# Patient Record
Sex: FEMALE | Race: OTHER | URBAN - METROPOLITAN AREA
[De-identification: names, ages, dates, MRNs, and addresses within clinical notes are randomized per-mention and may not be internally consistent; named-entity substitution may affect disease eponyms.]

---

## 2023-01-31 ENCOUNTER — OFFICE VISIT (OUTPATIENT)
Dept: FAMILY MEDICINE CLINIC | Age: 33
End: 2023-01-31

## 2023-01-31 ENCOUNTER — HOSPITAL ENCOUNTER (OUTPATIENT)
Dept: LAB | Age: 33
Discharge: HOME | End: 2023-01-31

## 2023-01-31 VITALS
OXYGEN SATURATION: 99 % | HEART RATE: 75 BPM | HEIGHT: 61 IN | BODY MASS INDEX: 26.43 KG/M2 | SYSTOLIC BLOOD PRESSURE: 113 MMHG | DIASTOLIC BLOOD PRESSURE: 74 MMHG | WEIGHT: 140 LBS | TEMPERATURE: 98.1 F

## 2023-01-31 DIAGNOSIS — R10.2 PELVIC PAIN: ICD-10-CM

## 2023-01-31 DIAGNOSIS — G43.109 MIGRAINE WITH AURA AND WITHOUT STATUS MIGRAINOSUS, NOT INTRACTABLE: ICD-10-CM

## 2023-01-31 DIAGNOSIS — R10.2 PELVIC PAIN: Primary | ICD-10-CM

## 2023-01-31 LAB
BILIRUB UR QL STRIP: NEGATIVE
GLUCOSE UR-MCNC: NEGATIVE MG/DL
KETONES P FAST UR STRIP-MCNC: NEGATIVE MG/DL
PH UR STRIP: 6.5 [PH] (ref 4.6–8)
PROT UR QL STRIP: NEGATIVE
SP GR UR STRIP: 1.03 (ref 1–1.03)
UA UROBILINOGEN AMB POC: NORMAL (ref 0.2–1)
URINALYSIS CLARITY POC: CLEAR
URINALYSIS COLOR POC: YELLOW
URINE BLOOD POC: NEGATIVE
URINE LEUKOCYTES POC: NEGATIVE
URINE NITRITES POC: NEGATIVE

## 2023-01-31 PROCEDURE — 99203 OFFICE O/P NEW LOW 30 MIN: CPT | Performed by: FAMILY MEDICINE

## 2023-01-31 PROCEDURE — 81002 URINALYSIS NONAUTO W/O SCOPE: CPT | Performed by: FAMILY MEDICINE

## 2023-01-31 NOTE — PROGRESS NOTES
Bria Hernandez is a 29 y.o. female   Chief Complaint   Patient presents with    Abdominal Pain     Patient c/o LLQ pain after menstrual cycle. Pain is describes as burning. Patient states she had \"tubal burning\" about 7 years ago. Pain is 5/10 mostly occurs at night.  Headache     Patient c/o migraines. One-sided headaches, severe enough that causes nausea. ASSESSMENT AND PLAN:    1. Pelvic pain  Considering endometriosis,ovarian cyst, fibroids, PID. Also constipation. Labs and pelvic ultrasound for workup.  - AMB POC URINALYSIS DIP STICK MANUAL W/O MICRO  - CBC WITH AUTOMATED DIFF; Future  - METABOLIC PANEL, COMPREHENSIVE; Future  - CT/NG/T.VAGINALIS AMPLIFICATION; Future  - US TRANSVAGINAL; Future  - US PELV NON OBS; Future    2. Migraine with aura and without status migrainosus, not intractable  Discussed preventive v. Abortive therapy. Pt is content with excedrin for abortive treatment when needed. - TSH 3RD GENERATION; Future      SUBJECTIVE:    HPI:  Silas Craig. Gurwinder Fernandez is a 29 y.o. female who presents for evaluation of LLQ/pelvic pain that has occurred after her menstrual cycle for 2-3 years. It is worst overnight and first thing in the morning. It is not generally bothersome during the day. It feels like a burning pain. She also has copious clear vaginal discharge during this time. She has occasional itching/irritation. Periods are regular. LMP about 1 week ago (1/24ish). She had BTL after her C/S 7 years ago. She reports occasional dysuria and urinary frequency. No STI history  Last pap about 7 years ago. She does suffer from constipation. She his migraine headaches occurring 3-4x/week. It responds to Excedrin. If she doesn't take excedrin right away she'll develop nausea, dizziness, photo and phonophobia and will have to lie down in a dark quiet room. She has never been on any prescription abortive or preventive medications.       Review of Systems Constitutional:  Negative for fever and malaise/fatigue. Eyes:  Negative for blurred vision. Respiratory:  Negative for cough and shortness of breath. Cardiovascular:  Negative for chest pain, palpitations and leg swelling. Gastrointestinal:  Positive for abdominal pain and constipation. Negative for diarrhea, nausea and vomiting. Genitourinary:  Positive for dysuria and frequency. Negative for flank pain, hematuria and urgency. Neurological:  Positive for headaches. /74 (BP 1 Location: Right upper arm, BP Patient Position: Sitting, BP Cuff Size: Adult)   Pulse 75   Temp 98.1 °F (36.7 °C) (Temporal)   Ht 5' 0.63\" (1.54 m)   Wt 140 lb (63.5 kg)   LMP 01/23/2023 (Approximate)   SpO2 99%   BMI 26.78 kg/m²     Physical Exam  Constitutional:       General: She is not in acute distress. Appearance: Normal appearance. HENT:      Mouth/Throat:      Pharynx: No oropharyngeal exudate or posterior oropharyngeal erythema. Eyes:      Extraocular Movements: Extraocular movements intact. Conjunctiva/sclera: Conjunctivae normal.      Pupils: Pupils are equal, round, and reactive to light. Cardiovascular:      Rate and Rhythm: Normal rate and regular rhythm. Heart sounds: Normal heart sounds. Pulmonary:      Effort: Pulmonary effort is normal.      Breath sounds: Normal breath sounds. Abdominal:      General: Bowel sounds are normal. There is no distension. Palpations: Abdomen is soft. Tenderness: There is abdominal tenderness (LLQ, left pelvic, suprapubic). There is no guarding. Musculoskeletal:      Cervical back: No tenderness. Lymphadenopathy:      Cervical: No cervical adenopathy. Neurological:      Mental Status: She is alert.

## 2023-01-31 NOTE — PROGRESS NOTES
Assisted in discharge. Name and date of birth of the patient verified. Information on the AVS was explained to patient/guardian and understanding was verbalized Time was made for question and answers. Assisted patient in scheduling [address] appointments. Appointment on February 9 at 10:00 at [facility]. Patient special instructions to drink 32-48 oz of water before procedure. Patient advised to call the [facility] to have an appointment with an  to apply for financial assistance/Care Card.

## 2023-01-31 NOTE — PROGRESS NOTES
Chief Complaint   Patient presents with    Abdominal Pain     Patient c/o LLQ pain after menstrual cycle. Pain is describes as burning. Patient states she had \"tubal burning\" about 7 years ago. Pain is 5/10 mostly occurs at night. Headache     Patient c/o migraines. One-sided headaches, severe enough that causes nausea.         Results for orders placed or performed in visit on 01/31/23   AMB POC URINALYSIS DIP STICK MANUAL W/O MICRO   Result Value Ref Range    Color (UA POC) Yellow     Clarity (UA POC) Clear     Glucose (UA POC) Negative Negative    Bilirubin (UA POC) Negative Negative    Ketones (UA POC) Negative Negative    Specific gravity (UA POC) 1.030 1.001 - 1.035    Blood (UA POC) Negative Negative    pH (UA POC) 6.5 4.6 - 8.0    Protein (UA POC) Negative Negative    Urobilinogen (UA POC) normal 0.2 - 1    Nitrites (UA POC) Negative Negative    Leukocyte esterase (UA POC) Negative Negative

## 2023-02-01 LAB
ALBUMIN SERPL-MCNC: 4 G/DL (ref 3.5–5)
ALBUMIN/GLOB SERPL: 1.3 (ref 1.1–2.2)
ALP SERPL-CCNC: 99 U/L (ref 45–117)
ALT SERPL-CCNC: 23 U/L (ref 12–78)
ANION GAP SERPL CALC-SCNC: 6 MMOL/L (ref 5–15)
AST SERPL-CCNC: 13 U/L (ref 15–37)
BASOPHILS # BLD: 0.1 K/UL (ref 0–0.1)
BASOPHILS NFR BLD: 1 % (ref 0–1)
BILIRUB SERPL-MCNC: 0.2 MG/DL (ref 0.2–1)
BUN SERPL-MCNC: 15 MG/DL (ref 6–20)
BUN/CREAT SERPL: 33 (ref 12–20)
CALCIUM SERPL-MCNC: 9.2 MG/DL (ref 8.5–10.1)
CHLORIDE SERPL-SCNC: 106 MMOL/L (ref 97–108)
CO2 SERPL-SCNC: 27 MMOL/L (ref 21–32)
COMMENT, HOLDF: NORMAL
CREAT SERPL-MCNC: 0.45 MG/DL (ref 0.55–1.02)
DIFFERENTIAL METHOD BLD: ABNORMAL
EOSINOPHIL # BLD: 0.1 K/UL (ref 0–0.4)
EOSINOPHIL NFR BLD: 1 % (ref 0–7)
ERYTHROCYTE [DISTWIDTH] IN BLOOD BY AUTOMATED COUNT: 15.1 % (ref 11.5–14.5)
GLOBULIN SER CALC-MCNC: 3.2 G/DL (ref 2–4)
GLUCOSE SERPL-MCNC: 79 MG/DL (ref 65–100)
HCT VFR BLD AUTO: 36.6 % (ref 35–47)
HGB BLD-MCNC: 11.1 G/DL (ref 11.5–16)
IMM GRANULOCYTES # BLD AUTO: 0 K/UL (ref 0–0.04)
IMM GRANULOCYTES NFR BLD AUTO: 0 % (ref 0–0.5)
LYMPHOCYTES # BLD: 2.4 K/UL (ref 0.8–3.5)
LYMPHOCYTES NFR BLD: 25 % (ref 12–49)
MCH RBC QN AUTO: 25 PG (ref 26–34)
MCHC RBC AUTO-ENTMCNC: 30.3 G/DL (ref 30–36.5)
MCV RBC AUTO: 82.4 FL (ref 80–99)
MONOCYTES # BLD: 0.7 K/UL (ref 0–1)
MONOCYTES NFR BLD: 7 % (ref 5–13)
NEUTS SEG # BLD: 6.3 K/UL (ref 1.8–8)
NEUTS SEG NFR BLD: 66 % (ref 32–75)
NRBC # BLD: 0 K/UL (ref 0–0.01)
NRBC BLD-RTO: 0 PER 100 WBC
PLATELET # BLD AUTO: 324 K/UL (ref 150–400)
PMV BLD AUTO: 10.5 FL (ref 8.9–12.9)
POTASSIUM SERPL-SCNC: 4.3 MMOL/L (ref 3.5–5.1)
PROT SERPL-MCNC: 7.2 G/DL (ref 6.4–8.2)
RBC # BLD AUTO: 4.44 M/UL (ref 3.8–5.2)
SAMPLES BEING HELD,HOLD: NORMAL
SODIUM SERPL-SCNC: 139 MMOL/L (ref 136–145)
TSH SERPL DL<=0.05 MIU/L-ACNC: 1.13 UIU/ML (ref 0.36–3.74)
WBC # BLD AUTO: 9.6 K/UL (ref 3.6–11)

## 2023-02-01 PROCEDURE — 80053 COMPREHEN METABOLIC PANEL: CPT

## 2023-02-01 PROCEDURE — 36415 COLL VENOUS BLD VENIPUNCTURE: CPT

## 2023-02-01 PROCEDURE — 84443 ASSAY THYROID STIM HORMONE: CPT

## 2023-02-01 PROCEDURE — 85025 COMPLETE CBC W/AUTO DIFF WBC: CPT

## 2023-02-01 PROCEDURE — 87491 CHLMYD TRACH DNA AMP PROBE: CPT

## 2023-02-03 LAB
C TRACH RRNA SPEC QL NAA+PROBE: NORMAL
N GONORRHOEA RRNA SPEC QL NAA+PROBE: NORMAL
SPECIMEN SOURCE: NORMAL
T VAGINALIS RRNA SPEC QL NAA+PROBE: NEGATIVE

## 2023-02-09 ENCOUNTER — HOSPITAL ENCOUNTER (OUTPATIENT)
Dept: ULTRASOUND IMAGING | Age: 33
Discharge: HOME | End: 2023-02-09
Attending: FAMILY MEDICINE

## 2023-02-09 ENCOUNTER — HOSPITAL ENCOUNTER (OUTPATIENT)
Dept: ULTRASOUND IMAGING | Age: 33
End: 2023-02-09
Attending: FAMILY MEDICINE

## 2023-02-09 DIAGNOSIS — R10.2 PELVIC PAIN: ICD-10-CM

## 2023-02-09 PROCEDURE — 76856 US EXAM PELVIC COMPLETE: CPT

## 2023-02-09 PROCEDURE — 76830 TRANSVAGINAL US NON-OB: CPT

## 2023-02-11 NOTE — PROGRESS NOTES
Please add her to my Virtual Visits this Wednesday 2/15 to review her ultrasound and lab results. Labs: mild anemia Hgb 11.1. Normal CMP, TSH. Negative GC/CT/TV. Ultrasound: Normal, with the exception of a postsurgical seroma along the uterus.

## 2023-02-15 ENCOUNTER — VIRTUAL VISIT (OUTPATIENT)
Dept: FAMILY MEDICINE CLINIC | Age: 33
End: 2023-02-15

## 2023-02-15 DIAGNOSIS — D50.9 IRON DEFICIENCY ANEMIA, UNSPECIFIED IRON DEFICIENCY ANEMIA TYPE: ICD-10-CM

## 2023-02-15 DIAGNOSIS — R10.2 PELVIC PAIN: Primary | ICD-10-CM

## 2023-02-15 PROCEDURE — 99213 OFFICE O/P EST LOW 20 MIN: CPT | Performed by: FAMILY MEDICINE

## 2023-02-15 NOTE — PROGRESS NOTES
Blanca Brannon is a 29 y.o. female   Chief Complaint   Patient presents with    Other     Review ultrasound and lab results          ASSESSMENT AND PLAN:    1. Pelvic pain  Hypoechoic area overlying uterus likely seroma. I wonder if it could be an endometrioma. Possible cause to her pain. Will refer to Gyn for further evaluation and treatment.    - REFERRAL TO GYNECOLOGY    2. Anemia  Hgb 11.1. Suggested MV with iron or iron supplementation. Pt isn't working and doesn't have much money. Recommended she could try to prioritize iron rich foods. SUBJECTIVE:    HPI:  Remington LucasHna Regalado is a 29 y.o. female who presents  to review results. She was seen 1/31 with Left pelvic pain which has been occurring after each menstrual cycle for about 3 years. Normal CMP, TSH. Negative GC/CT/TV. Ultrasound: \"anterior to the uterus is a hypoechoic heterogeneous oblong area measuring up to 5 x 2.6 x 1.6 cm, which may represent a postoperative seroma or additional scar. \"    No new concerns. Review of Systems   Constitutional:  Negative for fever, malaise/fatigue and weight loss. HENT:  Negative for congestion. Respiratory:  Negative for cough, shortness of breath and wheezing. Gastrointestinal:  Negative for abdominal pain, constipation, diarrhea, nausea and vomiting. Neurological:  Negative for headaches. Endo/Heme/Allergies:  Negative for polydipsia. LMP 01/23/2023 (Approximate)     Physical Exam - not performed; telephone encounter.

## 2023-02-15 NOTE — PROGRESS NOTES
Slade Jaimes was spoken to at the time of virtual discharge. Full name and  verified. The After Visit Summary was reviewed along with discharge instructions. Patient understands she can call the [place] [address] phone line to schedule an appointment for a pap test at her convenience. Patient was advised that she will be contacted by a [address] [place] [address] Coordinator in regards to her Gynecology referral. Patient was notified that [facility] has its own financial screening. Time for questions and answers provided, patient verbalizes understanding.

## 2023-02-15 NOTE — PROGRESS NOTES
Coordination of Care  1. Have you been to the ER, urgent care clinic since your last visit? Hospitalized since your last visit? No    2. Have you seen or consulted any other health care providers outside of the [address] [place] [address] since your last visit? Include any pap smears or colon screening. No    Does the patient need refills? YES    Learning Assessment Complete?  yes  Depression Screening complete in the past 12 months? yes    Chief Complaint   Patient presents with    Other     Review ultrasound and lab results

## 2023-02-22 ENCOUNTER — TELEPHONE (OUTPATIENT)
Dept: FAMILY MEDICINE CLINIC | Age: 33
End: 2023-02-22

## 2023-02-22 NOTE — TELEPHONE ENCOUNTER
Returned pt call. She wanted to cancel appt tomorrow because she has received some bills. I explained there is no charge for seeing the OW tomorrow and that OW will assist with FA application. Sent OW a message.

## 2023-02-23 ENCOUNTER — OFFICE VISIT (OUTPATIENT)
Dept: FAMILY MEDICINE CLINIC | Age: 33
End: 2023-02-23

## 2023-02-23 DIAGNOSIS — Z71.89 COUNSELING AND COORDINATION OF CARE: Primary | ICD-10-CM

## 2023-02-23 PROCEDURE — 99080 SPECIAL REPORTS OR FORMS: CPT | Performed by: FAMILY MEDICINE

## 2023-02-23 NOTE — PROGRESS NOTES
AN financial screening is complete. Patient has been instructed to call appointment line on or after 3/16/23. Patient has been screened for [facility]. Patient has requested information for [facility]. Information has been provided and patient has been informed [facility] provided food in needed.

## 2023-03-21 ENCOUNTER — OFFICE VISIT (OUTPATIENT)
Dept: FAMILY MEDICINE CLINIC | Age: 33
End: 2023-03-21

## 2023-03-21 ENCOUNTER — HOSPITAL ENCOUNTER (OUTPATIENT)
Facility: HOSPITAL | Age: 33
Setting detail: SPECIMEN
Discharge: HOME | End: 2023-03-24

## 2023-03-21 VITALS
WEIGHT: 138 LBS | SYSTOLIC BLOOD PRESSURE: 115 MMHG | OXYGEN SATURATION: 98 % | TEMPERATURE: 97.3 F | BODY MASS INDEX: 26.39 KG/M2 | HEART RATE: 75 BPM | DIASTOLIC BLOOD PRESSURE: 66 MMHG

## 2023-03-21 DIAGNOSIS — Z01.419 ENCOUNTER FOR WELL WOMAN EXAM: Primary | ICD-10-CM

## 2023-03-21 DIAGNOSIS — Z71.89 COUNSELING AND COORDINATION OF CARE: Primary | ICD-10-CM

## 2023-03-21 DIAGNOSIS — R10.2 PELVIC PAIN: ICD-10-CM

## 2023-03-21 PROCEDURE — 99080 SPECIAL REPORTS OR FORMS: CPT | Performed by: FAMILY MEDICINE

## 2023-03-21 PROCEDURE — 88175 CYTOPATH C/V AUTO FLUID REDO: CPT

## 2023-03-21 PROCEDURE — 99213 OFFICE O/P EST LOW 20 MIN: CPT | Performed by: PHYSICIAN ASSISTANT

## 2023-03-21 PROCEDURE — 87624 HPV HI-RISK TYP POOLED RSLT: CPT

## 2023-03-21 NOTE — PROGRESS NOTES
[address] [facility]    When was your last pap smear and where? 2016    Any abnormal results from previous pap smears? no    Any problems with your breasts? no    Any family history of breast/ovarian cancer? no    Do you have any kids? yes    Oldest 13.11 youngest 9    Are you sexually active? yes    Are you using any type of birth control? If yes where do you go for this? no    Abuse screening completed this visit?  yes

## 2023-03-21 NOTE — PROGRESS NOTES
Assessment/Plan:    Diagnoses and all orders for this visit:    1. Encounter for well woman exam  -     PAP IG, APTIMA HPV AND RFX 16/18,45 ([id number]); Future    2. Pelvic pain    Firm uterus just out of pelvis but her pain is actually left adnexal region and I don't feel a corresponding mass there. She has been approved to see gyn through access now and she was told today that she must call for the appt     Follow-up and Dispositions    Return in about 1 year (around 3/21/2024). Dustin Gil PA-C  [address]  expressed understanding of this plan. An AVS was printed and given to the patient.      ----------------------------------------------------------------------    Chief Complaint   Patient presents with    Gyn Exam       History of Present Illness:  Pt presents for well woman exam  She is in the process of being referred to gyn for chronic pelvic pain and abnl ultrasound  She is having monthly periods.  all 3 c/s and she had BTL after last c/s  She had her last pap about 7 years ago. She never had an abnormal pap  She is in a safe relationship and has no risk for DV       No past medical history on file. Current Outpatient Medications   Medication Sig Dispense Refill    aspirin/acetaminophen/caffeine (EXCEDRIN MIGRAINE PO) Take  by mouth. No Known Allergies    Social History     Tobacco Use    Smoking status: Never    Smokeless tobacco: Never   Substance Use Topics    Alcohol use: Not Currently     Comment: occassional    Drug use: Never       No family history on file. Physical Exam:     Visit Vitals  /66   Pulse 75   Temp 97.3 °F (36.3 °C) (Temporal)   Wt 138 lb (62.6 kg)   LMP 2023   SpO2 98%   BMI 26.39 kg/m²       A&Ox3  WDWN NAD  Respirations normal and non labored  Pelvic exam- ext neg for lesion or discharge. Cervix nulliparous w/out lesion or discharge. Bimanual shows firm uterus, just out of pelvis not tender.  No adnexal masses felt but tender over left adnexal region. US Results (maximum last 3): Results from Hospital Encounter encounter on 23    US TRANSVAGINAL    Narrative  HISTORY: Pelvic pain after menstruation    Technique: Routine transpelvic ultrasound images were obtained as well as  transvaginal images for better definition of the uterus and adnexa. FINDINGS: The transpelvic images demonstrate a distended urinary bladder without  evidence of filling defect. The uterus measures 8.6 x 4.3 x 5.9 cm. Myometrial  echogenicity is normal. The endometrial stripe measures 11 mm by transpelvic  technique. The right ovary measures 2.9 cm with intact vascularity. The left  ovary measures 3.5 cm with intact vascularity. Both ovaries demonstrate normal  echogenicity and no evidence of mass. Transvaginal images demonstrate a normal endometrial stripe measuring 8 mm in  thickness. No myometrial mass is seen. The cervix has a normal appearance. Within the  scar, there is a 12 x 6 x 10 mm anechoic area, and anterior  to the uterus is a hypoechoic heterogeneous oblong area measuring up to 5 x 2.6  x 1.6 cm, which may represent a postoperative seroma or additional scar. The  right ovary measures 2.7 cm. The left ovary measures 3.3 cm. There is a  dominant left ovarian follicle measuring 19 mm. There is no pelvic free fluid. Impression  1. Evidence of prior , with associated scar and suspected small  postsurgical seroma anterior to the uterus. 2. Normal ovaries and adnexa. US PELV NON OBS    Narrative  HISTORY: Pelvic pain after menstruation    Technique: Routine transpelvic ultrasound images were obtained as well as  transvaginal images for better definition of the uterus and adnexa. FINDINGS: The transpelvic images demonstrate a distended urinary bladder without  evidence of filling defect. The uterus measures 8.6 x 4.3 x 5.9 cm.  Myometrial  echogenicity is normal. The endometrial stripe measures 11 mm by transpelvic  technique. The right ovary measures 2.9 cm with intact vascularity. The left  ovary measures 3.5 cm with intact vascularity. Both ovaries demonstrate normal  echogenicity and no evidence of mass. Transvaginal images demonstrate a normal endometrial stripe measuring 8 mm in  thickness. No myometrial mass is seen. The cervix has a normal appearance. Within the  scar, there is a 12 x 6 x 10 mm anechoic area, and anterior  to the uterus is a hypoechoic heterogeneous oblong area measuring up to 5 x 2.6  x 1.6 cm, which may represent a postoperative seroma or additional scar. The  right ovary measures 2.7 cm. The left ovary measures 3.3 cm. There is a  dominant left ovarian follicle measuring 19 mm. There is no pelvic free fluid. Impression  1. Evidence of prior , with associated scar and suspected small  postsurgical seroma anterior to the uterus. 2. Normal ovaries and adnexa.

## 2023-03-21 NOTE — PROGRESS NOTES
Patient came to [facility] as a walk-in. Patient needed clarification in regards to the AN program and the CC application. OW has answered all questions for patient and has provided work cell phone number and advised patient to call if she had further questions.

## 2023-03-30 ENCOUNTER — DOCUMENTATION ONLY (OUTPATIENT)
Dept: FAMILY PLANNING/WOMEN'S HEALTH CLINIC | Age: 33
End: 2023-03-30

## 2023-03-30 ENCOUNTER — TELEPHONE (OUTPATIENT)
Dept: FAMILY MEDICINE CLINIC | Age: 33
End: 2023-03-30

## 2023-03-30 NOTE — PROGRESS NOTES
Pap result review; pap is scanned under media section  Neg cytology, neg HPV. No evidence of BV on exam. Please let the pt know that her next pap is due in 5 years, 2028.  Thank you

## 2023-03-30 NOTE — TELEPHONE ENCOUNTER
SDOH f/up: Patient says she did not receive [address] information. OW explained process to get food and sent [address] phone number via text message after telephone encounter. Score 4.

## 2023-05-30 ENCOUNTER — OFFICE VISIT (OUTPATIENT)
Age: 33
End: 2023-05-30
Payer: SUBSIDIZED

## 2023-05-30 VITALS — WEIGHT: 139 LBS | BODY MASS INDEX: 26.59 KG/M2 | SYSTOLIC BLOOD PRESSURE: 110 MMHG | DIASTOLIC BLOOD PRESSURE: 74 MMHG

## 2023-05-30 DIAGNOSIS — N99.842 SEROMA OF GENITOURINARY SYSTEM AFTER GENITOURINARY SYSTEM PROCEDURE: ICD-10-CM

## 2023-05-30 DIAGNOSIS — N94.6 DYSMENORRHEA: Primary | ICD-10-CM

## 2023-05-30 DIAGNOSIS — R10.32 LLQ PAIN: ICD-10-CM

## 2023-05-30 PROCEDURE — 99204 OFFICE O/P NEW MOD 45 MIN: CPT | Performed by: OBSTETRICS & GYNECOLOGY

## 2023-05-30 RX ORDER — ACETAMINOPHEN, ASPIRIN AND CAFFEINE 250; 250; 65 MG/1; MG/1; MG/1
TABLET, FILM COATED ORAL
COMMUNITY

## 2023-05-30 NOTE — PROGRESS NOTES
GYN Problem Visit    Christina Allen is a 29 y.o.  presenting for problem visit. Interpretor #[id number] initially, session timed out. Visit completed with Interpretor #[id number]. Her main concern today is LLQ pain for the last seven years. The pain is worsening in intensity. Patient reports the pain occurs 2 days after completion of her cycle. Pain last for about a week and then subsides. She takes over-the-counter medication for the pain. The pain is burning in nature. She has seen her primary care physician regarding this pain. She was sent here for further work-up. Her history is complicated by dysmenorrhea that has been present for years. She denies ever being told that she had endometriosis. She has had 3 peripheral previous  sections with a tubal ligation. Ob/Gyn Hx:   - LTCS  LMP- \"beginning of May\"   Menses- regular  Contraception- BTL  SA- yes, male partner      History reviewed. No pertinent past medical history. Past Surgical History:   Procedure Laterality Date    TUBAL LIGATION         History reviewed. No pertinent family history.     Social History     Socioeconomic History    Marital status: Single     Spouse name: Not on file    Number of children: Not on file    Years of education: Not on file    Highest education level: Not on file   Occupational History    Not on file   Tobacco Use    Smoking status: Never    Smokeless tobacco: Never   Substance and Sexual Activity    Alcohol use: Not Currently    Drug use: Never    Sexual activity: Not on file   Other Topics Concern    Not on file   Social History Narrative    Not on file     Social Determinants of Health     Financial Resource Strain: High Risk    Difficulty of Paying Living Expenses: Very hard   Food Insecurity: Food Insecurity Present    Worried About Running Out of Food in the Last Year: Sometimes true    Ran Out of Food in the Last Year: Sometimes true   Transportation Needs: Not on file

## 2023-05-30 NOTE — CONSULTS
Session ID: [id number]  Request ID: [id number]  Language: Sinhala  Status: Fulfilled   ID: #[id number]   Name: Dipak Keane

## 2023-06-27 ENCOUNTER — OFFICE VISIT (OUTPATIENT)
Age: 33
End: 2023-06-27
Payer: SUBSIDIZED

## 2023-06-27 VITALS — DIASTOLIC BLOOD PRESSURE: 80 MMHG | SYSTOLIC BLOOD PRESSURE: 104 MMHG | BODY MASS INDEX: 27.08 KG/M2 | WEIGHT: 141.6 LBS

## 2023-06-27 DIAGNOSIS — G43.809 OTHER MIGRAINE WITHOUT STATUS MIGRAINOSUS, NOT INTRACTABLE: ICD-10-CM

## 2023-06-27 DIAGNOSIS — R10.2 CYCLICAL PELVIC PAIN: Primary | ICD-10-CM

## 2023-06-27 PROCEDURE — 99214 OFFICE O/P EST MOD 30 MIN: CPT | Performed by: OBSTETRICS & GYNECOLOGY

## 2023-06-27 RX ORDER — DROSPIRENONE 4 MG/1
1 TABLET, FILM COATED ORAL DAILY
Qty: 28 TABLET | Refills: 5 | Status: SHIPPED | OUTPATIENT
Start: 2023-06-27